# Patient Record
Sex: FEMALE
[De-identification: names, ages, dates, MRNs, and addresses within clinical notes are randomized per-mention and may not be internally consistent; named-entity substitution may affect disease eponyms.]

---

## 2021-04-11 ENCOUNTER — FORM ENCOUNTER (OUTPATIENT)
Age: 36
End: 2021-04-11

## 2021-04-12 ENCOUNTER — FORM ENCOUNTER (OUTPATIENT)
Age: 36
End: 2021-04-12

## 2021-04-12 ENCOUNTER — APPOINTMENT (OUTPATIENT)
Dept: OBGYN | Facility: CLINIC | Age: 36
End: 2021-04-12
Payer: COMMERCIAL

## 2021-04-12 PROCEDURE — 99072 ADDL SUPL MATRL&STAF TM PHE: CPT

## 2021-04-12 PROCEDURE — 99385 PREV VISIT NEW AGE 18-39: CPT

## 2021-09-16 ENCOUNTER — FORM ENCOUNTER (OUTPATIENT)
Age: 36
End: 2021-09-16

## 2021-09-17 ENCOUNTER — APPOINTMENT (OUTPATIENT)
Dept: OBGYN | Facility: CLINIC | Age: 36
End: 2021-09-17
Payer: COMMERCIAL

## 2021-09-17 PROCEDURE — 76817 TRANSVAGINAL US OBSTETRIC: CPT

## 2021-09-17 PROCEDURE — 36415 COLL VENOUS BLD VENIPUNCTURE: CPT

## 2021-09-17 PROCEDURE — 99213 OFFICE O/P EST LOW 20 MIN: CPT | Mod: 25

## 2021-09-21 ENCOUNTER — FORM ENCOUNTER (OUTPATIENT)
Age: 36
End: 2021-09-21

## 2021-09-26 ENCOUNTER — FORM ENCOUNTER (OUTPATIENT)
Age: 36
End: 2021-09-26

## 2021-09-27 ENCOUNTER — TRANSCRIPTION ENCOUNTER (OUTPATIENT)
Age: 36
End: 2021-09-27

## 2021-10-11 ENCOUNTER — FORM ENCOUNTER (OUTPATIENT)
Age: 36
End: 2021-10-11

## 2021-10-12 ENCOUNTER — APPOINTMENT (OUTPATIENT)
Dept: OBGYN | Facility: CLINIC | Age: 36
End: 2021-10-12
Payer: COMMERCIAL

## 2021-10-12 PROCEDURE — 0500F INITIAL PRENATAL CARE VISIT: CPT

## 2021-10-12 PROCEDURE — 76817 TRANSVAGINAL US OBSTETRIC: CPT

## 2021-10-12 PROCEDURE — 36415 COLL VENOUS BLD VENIPUNCTURE: CPT

## 2021-10-26 ENCOUNTER — ASOB RESULT (OUTPATIENT)
Age: 36
End: 2021-10-26

## 2021-10-26 ENCOUNTER — APPOINTMENT (OUTPATIENT)
Dept: ANTEPARTUM | Facility: CLINIC | Age: 36
End: 2021-10-26
Payer: COMMERCIAL

## 2021-10-26 PROBLEM — Z00.00 ENCOUNTER FOR PREVENTIVE HEALTH EXAMINATION: Status: ACTIVE | Noted: 2021-10-26

## 2021-10-26 PROCEDURE — 76817 TRANSVAGINAL US OBSTETRIC: CPT

## 2021-10-26 PROCEDURE — 76813 OB US NUCHAL MEAS 1 GEST: CPT

## 2021-10-26 PROCEDURE — 76811 OB US DETAILED SNGL FETUS: CPT

## 2021-11-23 ENCOUNTER — APPOINTMENT (OUTPATIENT)
Dept: OBGYN | Facility: CLINIC | Age: 36
End: 2021-11-23
Payer: COMMERCIAL

## 2021-11-23 VITALS
WEIGHT: 143 LBS | BODY MASS INDEX: 22.44 KG/M2 | SYSTOLIC BLOOD PRESSURE: 94 MMHG | DIASTOLIC BLOOD PRESSURE: 60 MMHG | HEIGHT: 67 IN

## 2021-11-23 DIAGNOSIS — Z86.39 PERSONAL HISTORY OF OTHER ENDOCRINE, NUTRITIONAL AND METABOLIC DISEASE: ICD-10-CM

## 2021-11-23 PROCEDURE — 36415 COLL VENOUS BLD VENIPUNCTURE: CPT

## 2021-11-23 PROCEDURE — 0502F SUBSEQUENT PRENATAL CARE: CPT

## 2021-11-24 ENCOUNTER — APPOINTMENT (OUTPATIENT)
Dept: ANTEPARTUM | Facility: CLINIC | Age: 36
End: 2021-11-24

## 2021-11-28 LAB
1ST TRIMESTER DATA: NORMAL
2ND TRIMESTER DATA: NORMAL
AFP PNL SERPL: NORMAL
AFP SERPL-ACNC: NORMAL
AFP SERPL-ACNC: NORMAL
B-HCG FREE SERPL-MCNC: NORMAL
CLINICAL BIOCHEMIST REVIEW: NORMAL
FREE BETA HCG 1ST TRIMESTER: NORMAL
INHIBIN A SERPL-MCNC: NORMAL
INHIBIN-A 1ST TRIMESTER: NORMAL
NOTES NTD: NORMAL
NT: NORMAL
PAPP-A SERPL-ACNC: NORMAL
PIGF SER-MCNC: NORMAL
U ESTRIOL SERPL-SCNC: NORMAL

## 2021-12-03 ENCOUNTER — TRANSCRIPTION ENCOUNTER (OUTPATIENT)
Age: 36
End: 2021-12-03

## 2022-01-03 ENCOUNTER — APPOINTMENT (OUTPATIENT)
Dept: ANTEPARTUM | Facility: CLINIC | Age: 37
End: 2022-01-03
Payer: COMMERCIAL

## 2022-01-03 ENCOUNTER — ASOB RESULT (OUTPATIENT)
Age: 37
End: 2022-01-03

## 2022-01-03 PROCEDURE — 76811 OB US DETAILED SNGL FETUS: CPT

## 2022-01-03 PROCEDURE — 76817 TRANSVAGINAL US OBSTETRIC: CPT

## 2022-01-07 ENCOUNTER — NON-APPOINTMENT (OUTPATIENT)
Age: 37
End: 2022-01-07

## 2022-01-07 DIAGNOSIS — Z98.890 OTHER SPECIFIED POSTPROCEDURAL STATES: ICD-10-CM

## 2022-01-07 DIAGNOSIS — Z87.891 PERSONAL HISTORY OF NICOTINE DEPENDENCE: ICD-10-CM

## 2022-01-07 DIAGNOSIS — Z87.59 PERSONAL HISTORY OF OTHER COMPLICATIONS OF PREGNANCY, CHILDBIRTH AND THE PUERPERIUM: ICD-10-CM

## 2022-01-07 RX ORDER — ASCORBIC ACID, CHOLECALCIFEROL, .ALPHA.-TOCOPHEROL ACETATE, DL-, PYRIDOXINE, FOLIC ACID, CYANOCOBALAMIN, CALCIUM, FERROUS FUMARATE, MAGNESIUM, DOCONEXENT 85; 200; 10; 25; 1; 12; 140; 27; 45; 300 [IU]/1; [IU]/1; [IU]/1; [IU]/1; MG/1; UG/1; MG/1; MG/1; MG/1; MG/1
CAPSULE, GELATIN COATED ORAL
Refills: 0 | Status: ACTIVE | COMMUNITY

## 2022-01-07 RX ORDER — CHROMIUM 200 MCG
1000 TABLET ORAL
Refills: 0 | Status: ACTIVE | COMMUNITY

## 2022-01-10 ENCOUNTER — APPOINTMENT (OUTPATIENT)
Dept: OBGYN | Facility: CLINIC | Age: 37
End: 2022-01-10
Payer: COMMERCIAL

## 2022-01-10 VITALS
WEIGHT: 152 LBS | BODY MASS INDEX: 23.86 KG/M2 | DIASTOLIC BLOOD PRESSURE: 62 MMHG | SYSTOLIC BLOOD PRESSURE: 94 MMHG | HEIGHT: 67 IN

## 2022-01-10 PROCEDURE — 36415 COLL VENOUS BLD VENIPUNCTURE: CPT

## 2022-01-10 PROCEDURE — 0502F SUBSEQUENT PRENATAL CARE: CPT

## 2022-01-11 ENCOUNTER — NON-APPOINTMENT (OUTPATIENT)
Age: 37
End: 2022-01-11

## 2022-01-11 ENCOUNTER — TRANSCRIPTION ENCOUNTER (OUTPATIENT)
Age: 37
End: 2022-01-11

## 2022-01-11 LAB — TSH SERPL-ACNC: 3.59 UIU/ML

## 2022-01-12 ENCOUNTER — APPOINTMENT (OUTPATIENT)
Dept: ANTEPARTUM | Facility: CLINIC | Age: 37
End: 2022-01-12
Payer: COMMERCIAL

## 2022-01-12 ENCOUNTER — ASOB RESULT (OUTPATIENT)
Age: 37
End: 2022-01-12

## 2022-01-12 PROCEDURE — 76816 OB US FOLLOW-UP PER FETUS: CPT

## 2022-02-16 ENCOUNTER — LABORATORY RESULT (OUTPATIENT)
Age: 37
End: 2022-02-16

## 2022-02-16 ENCOUNTER — APPOINTMENT (OUTPATIENT)
Dept: OBGYN | Facility: CLINIC | Age: 37
End: 2022-02-16
Payer: COMMERCIAL

## 2022-02-16 ENCOUNTER — NON-APPOINTMENT (OUTPATIENT)
Age: 37
End: 2022-02-16

## 2022-02-16 VITALS
BODY MASS INDEX: 24.94 KG/M2 | HEIGHT: 67 IN | DIASTOLIC BLOOD PRESSURE: 64 MMHG | SYSTOLIC BLOOD PRESSURE: 110 MMHG | WEIGHT: 158.93 LBS

## 2022-02-16 PROCEDURE — 0502F SUBSEQUENT PRENATAL CARE: CPT

## 2022-02-17 LAB
25(OH)D3 SERPL-MCNC: 30.9 NG/ML
BASOPHILS # BLD AUTO: 0.02 K/UL
BASOPHILS NFR BLD AUTO: 0.2 %
EOSINOPHIL # BLD AUTO: 0.16 K/UL
EOSINOPHIL NFR BLD AUTO: 1.7 %
GLUCOSE 1H P 50 G GLC PO SERPL-MCNC: 57 MG/DL
HCT VFR BLD CALC: 31.7 %
HGB BLD-MCNC: 10.1 G/DL
IMM GRANULOCYTES NFR BLD AUTO: 0.5 %
LYMPHOCYTES # BLD AUTO: 1.85 K/UL
LYMPHOCYTES NFR BLD AUTO: 19.6 %
MAN DIFF?: NORMAL
MCHC RBC-ENTMCNC: 31.3 PG
MCHC RBC-ENTMCNC: 31.9 GM/DL
MCV RBC AUTO: 98.1 FL
MONOCYTES # BLD AUTO: 0.85 K/UL
MONOCYTES NFR BLD AUTO: 9 %
NEUTROPHILS # BLD AUTO: 6.51 K/UL
NEUTROPHILS NFR BLD AUTO: 69 %
PLATELET # BLD AUTO: 240 K/UL
RBC # BLD: 3.23 M/UL
RBC # FLD: 12.9 %
WBC # FLD AUTO: 9.44 K/UL

## 2022-02-18 ENCOUNTER — TRANSCRIPTION ENCOUNTER (OUTPATIENT)
Age: 37
End: 2022-02-18

## 2022-02-18 LAB
HIV1+2 AB SPEC QL IA.RAPID: NONREACTIVE
T PALLIDUM AB SER QL IA: NEGATIVE

## 2022-02-22 ENCOUNTER — TRANSCRIPTION ENCOUNTER (OUTPATIENT)
Age: 37
End: 2022-02-22

## 2022-03-18 ENCOUNTER — APPOINTMENT (OUTPATIENT)
Dept: OBGYN | Facility: CLINIC | Age: 37
End: 2022-03-18
Payer: COMMERCIAL

## 2022-03-18 ENCOUNTER — NON-APPOINTMENT (OUTPATIENT)
Age: 37
End: 2022-03-18

## 2022-03-18 ENCOUNTER — ASOB RESULT (OUTPATIENT)
Age: 37
End: 2022-03-18

## 2022-03-18 PROCEDURE — 76816 OB US FOLLOW-UP PER FETUS: CPT

## 2022-03-18 PROCEDURE — 0502F SUBSEQUENT PRENATAL CARE: CPT

## 2022-03-18 PROCEDURE — 76819 FETAL BIOPHYS PROFIL W/O NST: CPT

## 2022-03-21 LAB
CANDIDA VAG CYTO: NOT DETECTED
G VAGINALIS+PREV SP MTYP VAG QL MICRO: NOT DETECTED
T VAGINALIS VAG QL WET PREP: NOT DETECTED

## 2022-03-29 ENCOUNTER — APPOINTMENT (OUTPATIENT)
Dept: OBGYN | Facility: CLINIC | Age: 37
End: 2022-03-29
Payer: COMMERCIAL

## 2022-03-29 VITALS
HEIGHT: 67 IN | SYSTOLIC BLOOD PRESSURE: 122 MMHG | DIASTOLIC BLOOD PRESSURE: 70 MMHG | WEIGHT: 162.68 LBS | BODY MASS INDEX: 25.53 KG/M2

## 2022-03-29 DIAGNOSIS — Z23 ENCOUNTER FOR IMMUNIZATION: ICD-10-CM

## 2022-03-29 PROCEDURE — 90715 TDAP VACCINE 7 YRS/> IM: CPT

## 2022-03-29 PROCEDURE — 90471 IMMUNIZATION ADMIN: CPT

## 2022-03-29 PROCEDURE — 0502F SUBSEQUENT PRENATAL CARE: CPT

## 2022-04-13 ENCOUNTER — NON-APPOINTMENT (OUTPATIENT)
Age: 37
End: 2022-04-13

## 2022-04-13 ENCOUNTER — APPOINTMENT (OUTPATIENT)
Dept: OBGYN | Facility: CLINIC | Age: 37
End: 2022-04-13
Payer: COMMERCIAL

## 2022-04-13 VITALS
WEIGHT: 160.93 LBS | SYSTOLIC BLOOD PRESSURE: 106 MMHG | DIASTOLIC BLOOD PRESSURE: 62 MMHG | HEIGHT: 67 IN | BODY MASS INDEX: 25.26 KG/M2

## 2022-04-13 PROCEDURE — 0502F SUBSEQUENT PRENATAL CARE: CPT

## 2022-04-15 LAB
GP B STREP DNA SPEC QL NAA+PROBE: NORMAL
GP B STREP DNA SPEC QL NAA+PROBE: NOT DETECTED
SOURCE GBS: NORMAL

## 2022-04-18 ENCOUNTER — NON-APPOINTMENT (OUTPATIENT)
Age: 37
End: 2022-04-18

## 2022-04-26 ENCOUNTER — APPOINTMENT (OUTPATIENT)
Dept: OBGYN | Facility: CLINIC | Age: 37
End: 2022-04-26
Payer: COMMERCIAL

## 2022-04-26 VITALS
WEIGHT: 163.34 LBS | DIASTOLIC BLOOD PRESSURE: 60 MMHG | HEIGHT: 67 IN | SYSTOLIC BLOOD PRESSURE: 102 MMHG | BODY MASS INDEX: 25.64 KG/M2

## 2022-04-26 PROCEDURE — 0502F SUBSEQUENT PRENATAL CARE: CPT

## 2022-05-06 ENCOUNTER — APPOINTMENT (OUTPATIENT)
Dept: OBGYN | Facility: CLINIC | Age: 37
End: 2022-05-06
Payer: COMMERCIAL

## 2022-05-06 VITALS
DIASTOLIC BLOOD PRESSURE: 60 MMHG | HEIGHT: 67 IN | BODY MASS INDEX: 26.21 KG/M2 | SYSTOLIC BLOOD PRESSURE: 102 MMHG | WEIGHT: 167 LBS

## 2022-05-06 PROCEDURE — 0502F SUBSEQUENT PRENATAL CARE: CPT

## 2022-05-10 ENCOUNTER — INPATIENT (INPATIENT)
Facility: HOSPITAL | Age: 37
LOS: 2 days | Discharge: HOME | End: 2022-05-13
Attending: STUDENT IN AN ORGANIZED HEALTH CARE EDUCATION/TRAINING PROGRAM | Admitting: STUDENT IN AN ORGANIZED HEALTH CARE EDUCATION/TRAINING PROGRAM
Payer: COMMERCIAL

## 2022-05-10 ENCOUNTER — ASOB RESULT (OUTPATIENT)
Age: 37
End: 2022-05-10

## 2022-05-10 ENCOUNTER — NON-APPOINTMENT (OUTPATIENT)
Age: 37
End: 2022-05-10

## 2022-05-10 ENCOUNTER — APPOINTMENT (OUTPATIENT)
Dept: OBGYN | Facility: CLINIC | Age: 37
End: 2022-05-10
Payer: COMMERCIAL

## 2022-05-10 VITALS
TEMPERATURE: 98 F | DIASTOLIC BLOOD PRESSURE: 64 MMHG | HEART RATE: 69 BPM | SYSTOLIC BLOOD PRESSURE: 109 MMHG | RESPIRATION RATE: 18 BRPM

## 2022-05-10 DIAGNOSIS — Z3A.00 WEEKS OF GESTATION OF PREGNANCY NOT SPECIFIED: ICD-10-CM

## 2022-05-10 DIAGNOSIS — Z34.80 ENCOUNTER FOR SUPERVISION OF OTHER NORMAL PREGNANCY, UNSPECIFIED TRIMESTER: ICD-10-CM

## 2022-05-10 DIAGNOSIS — O26.899 OTHER SPECIFIED PREGNANCY RELATED CONDITIONS, UNSPECIFIED TRIMESTER: ICD-10-CM

## 2022-05-10 LAB
BASOPHILS # BLD AUTO: 0.02 K/UL — SIGNIFICANT CHANGE UP (ref 0–0.2)
BASOPHILS NFR BLD AUTO: 0.2 % — SIGNIFICANT CHANGE UP (ref 0–2)
BLD GP AB SCN SERPL QL: POSITIVE — SIGNIFICANT CHANGE UP
COVID-19 SPIKE DOMAIN AB INTERP: POSITIVE
COVID-19 SPIKE DOMAIN ANTIBODY RESULT: >250 U/ML — HIGH
EOSINOPHIL # BLD AUTO: 0.08 K/UL — SIGNIFICANT CHANGE UP (ref 0–0.5)
EOSINOPHIL NFR BLD AUTO: 0.8 % — SIGNIFICANT CHANGE UP (ref 0–6)
HCT VFR BLD CALC: 40.7 % — SIGNIFICANT CHANGE UP (ref 34.5–45)
HGB BLD-MCNC: 13.3 G/DL — SIGNIFICANT CHANGE UP (ref 11.5–15.5)
IMM GRANULOCYTES NFR BLD AUTO: 0.8 % — SIGNIFICANT CHANGE UP (ref 0–1.5)
LYMPHOCYTES # BLD AUTO: 1.27 K/UL — SIGNIFICANT CHANGE UP (ref 1–3.3)
LYMPHOCYTES # BLD AUTO: 13.4 % — SIGNIFICANT CHANGE UP (ref 13–44)
MCHC RBC-ENTMCNC: 31.6 PG — SIGNIFICANT CHANGE UP (ref 27–34)
MCHC RBC-ENTMCNC: 32.7 GM/DL — SIGNIFICANT CHANGE UP (ref 32–36)
MCV RBC AUTO: 96.7 FL — SIGNIFICANT CHANGE UP (ref 80–100)
MONOCYTES # BLD AUTO: 0.67 K/UL — SIGNIFICANT CHANGE UP (ref 0–0.9)
MONOCYTES NFR BLD AUTO: 7.1 % — SIGNIFICANT CHANGE UP (ref 2–14)
NEUTROPHILS # BLD AUTO: 7.36 K/UL — SIGNIFICANT CHANGE UP (ref 1.8–7.4)
NEUTROPHILS NFR BLD AUTO: 77.7 % — HIGH (ref 43–77)
NRBC # BLD: 0 /100 WBCS — SIGNIFICANT CHANGE UP (ref 0–0)
PLATELET # BLD AUTO: 178 K/UL — SIGNIFICANT CHANGE UP (ref 150–400)
RBC # BLD: 4.21 M/UL — SIGNIFICANT CHANGE UP (ref 3.8–5.2)
RBC # FLD: 13.4 % — SIGNIFICANT CHANGE UP (ref 10.3–14.5)
RH IG SCN BLD-IMP: NEGATIVE — SIGNIFICANT CHANGE UP
RH IG SCN BLD-IMP: NEGATIVE — SIGNIFICANT CHANGE UP
SARS-COV-2 IGG+IGM SERPL QL IA: >250 U/ML — HIGH
SARS-COV-2 IGG+IGM SERPL QL IA: POSITIVE
SARS-COV-2 RNA SPEC QL NAA+PROBE: SIGNIFICANT CHANGE UP
WBC # BLD: 9.48 K/UL — SIGNIFICANT CHANGE UP (ref 3.8–10.5)
WBC # FLD AUTO: 9.48 K/UL — SIGNIFICANT CHANGE UP (ref 3.8–10.5)

## 2022-05-10 PROCEDURE — 76819 FETAL BIOPHYS PROFIL W/O NST: CPT

## 2022-05-10 PROCEDURE — 86077 PHYS BLOOD BANK SERV XMATCH: CPT

## 2022-05-10 PROCEDURE — 76816 OB US FOLLOW-UP PER FETUS: CPT

## 2022-05-10 PROCEDURE — 0502F SUBSEQUENT PRENATAL CARE: CPT

## 2022-05-10 RX ORDER — SODIUM CHLORIDE 9 MG/ML
1000 INJECTION, SOLUTION INTRAVENOUS
Refills: 0 | Status: DISCONTINUED | OUTPATIENT
Start: 2022-05-10 | End: 2022-05-11

## 2022-05-10 RX ORDER — OXYTOCIN 10 UNIT/ML
333.33 VIAL (ML) INJECTION
Qty: 20 | Refills: 0 | Status: DISCONTINUED | OUTPATIENT
Start: 2022-05-10 | End: 2022-05-10

## 2022-05-10 RX ORDER — OXYTOCIN 10 UNIT/ML
333.33 VIAL (ML) INJECTION
Qty: 20 | Refills: 0 | Status: DISCONTINUED | OUTPATIENT
Start: 2022-05-10 | End: 2022-05-13

## 2022-05-10 RX ORDER — CITRIC ACID/SODIUM CITRATE 300-500 MG
15 SOLUTION, ORAL ORAL EVERY 6 HOURS
Refills: 0 | Status: DISCONTINUED | OUTPATIENT
Start: 2022-05-10 | End: 2022-05-10

## 2022-05-10 RX ORDER — CITRIC ACID/SODIUM CITRATE 300-500 MG
15 SOLUTION, ORAL ORAL EVERY 6 HOURS
Refills: 0 | Status: DISCONTINUED | OUTPATIENT
Start: 2022-05-10 | End: 2022-05-11

## 2022-05-10 RX ORDER — OXYTOCIN 10 UNIT/ML
4 VIAL (ML) INJECTION
Qty: 30 | Refills: 0 | Status: DISCONTINUED | OUTPATIENT
Start: 2022-05-10 | End: 2022-05-11

## 2022-05-10 RX ADMIN — Medication 4 MILLIUNIT(S)/MIN: at 14:10

## 2022-05-10 NOTE — OB PROVIDER LABOR PROGRESS NOTE - ASSESSMENT
35yo P1 @ 40wks, iol for oligo  on pitocin  cont epidural    peanut ball  maternal and fetal status overall reassuring  anticipate anuradha parham MD
37yo P1 @ 40wks, iol for oligo  on pitocin  arom, scant clear fluid  epidural prn  peanut ball  maternal and fetal status overall reassuring  anticipate   h prasad PETTY

## 2022-05-10 NOTE — OB PROVIDER H&P - HISTORY OF PRESENT ILLNESS
35yo  [facility] 5/10/22 @ 40weeks sent from the office for IOL for oligohydramnios. Pt denies ctx/lof/vb  +FM  GBS negative   EFW 3700  PNC c/b anemia  PNL:    OB: 2018 FT  7#        2020 6w sab (no D&C)  GYN:Denies fibroids/ovarian cysts/STI's/abnl pap  PMH: h/o hypothyroidism, now euthyroid  PSH: none  MEDS: PNV  ALL:NKDA  Accepts blood. Denies h/o anxiety/depression.  35yo  [facility] 5/10/22 @ 40weeks sent from the office for IOL for oligohydramnios. JM 3.46. Pt denies ctx/lof/vb  +FM  GBS negative   EFW 3700  PNC c/b iron deficiency anemia  PNL:    OB: 2018 FT  7#        2020 6w sab (no D&C)  GYN:Denies fibroids/ovarian cysts/STI's/abnl pap  PMH: h/o hypothyroidism, now euthyroid  PSH: none  MEDS: PNV  ALL:NKDA  Accepts blood. Denies h/o anxiety/depression.

## 2022-05-10 NOTE — OB PROVIDER H&P - NSHPPHYSICALEXAM_GEN_ALL_CORE
T(C): --  HR: 69 (05-10-22 @ 12:52) (69 - 69)  BP: 109/64 (05-10-22 @ 13:03) (109/64 - 109/64)    GEN:NAD  CV:RRR  Pulm: CTA bl  Abd soft NT gravid  FHT:   130  , mod flor, + accels, - decels   TOCO:  no ctx  VE: 1.5/50/-3 per Dr Martinez MONGE vtx

## 2022-05-10 NOTE — OB PROVIDER H&P - ASSESSMENT
AP 35yo  [facility] 5/10/22 @40 weeks, IOL for oligohydramnios, GBS negative  1.Admit. CLD. Routine labs  2.Cat 1 FHT, cw EF/TOCO  3.IOL for Pitocin/AROM  4. Anticipate   5. Anesthesia consult  DW Dr Martinez Shi PAC

## 2022-05-10 NOTE — OB PROVIDER H&P - ATTENDING COMMENTS
agree w above by EDUARDA Shi  briefly, 37yo  @40 weeks, IOL for oligohydramnios, GBS negative  IOL for Pitocin/AROM  Anticipate   Epi prn  plan of care reviewed, questions answered  INDIGO Henriquez MD

## 2022-05-10 NOTE — OB RN PATIENT PROFILE - FUNCTIONAL ASSESSMENT - BASIC MOBILITY 3.
,[email or web address],DirectAddress_Unknown,DirectAddress_Unknown
4 = No assist / stand by assistance

## 2022-05-11 LAB — T PALLIDUM AB TITR SER: NEGATIVE — SIGNIFICANT CHANGE UP

## 2022-05-11 PROCEDURE — 59400 OBSTETRICAL CARE: CPT

## 2022-05-11 RX ORDER — IBUPROFEN 200 MG
600 TABLET ORAL EVERY 6 HOURS
Refills: 0 | Status: COMPLETED | OUTPATIENT
Start: 2022-05-11 | End: 2023-04-09

## 2022-05-11 RX ORDER — DIBUCAINE 1 %
1 OINTMENT (GRAM) RECTAL EVERY 6 HOURS
Refills: 0 | Status: DISCONTINUED | OUTPATIENT
Start: 2022-05-11 | End: 2022-05-13

## 2022-05-11 RX ORDER — OXYTOCIN 10 UNIT/ML
333.33 VIAL (ML) INJECTION
Qty: 20 | Refills: 0 | Status: DISCONTINUED | OUTPATIENT
Start: 2022-05-11 | End: 2022-05-13

## 2022-05-11 RX ORDER — ACETAMINOPHEN 500 MG
975 TABLET ORAL
Refills: 0 | Status: DISCONTINUED | OUTPATIENT
Start: 2022-05-11 | End: 2022-05-13

## 2022-05-11 RX ORDER — TETANUS TOXOID, REDUCED DIPHTHERIA TOXOID AND ACELLULAR PERTUSSIS VACCINE, ADSORBED 5; 2.5; 8; 8; 2.5 [IU]/.5ML; [IU]/.5ML; UG/.5ML; UG/.5ML; UG/.5ML
0.5 SUSPENSION INTRAMUSCULAR ONCE
Refills: 0 | Status: DISCONTINUED | OUTPATIENT
Start: 2022-05-11 | End: 2022-05-13

## 2022-05-11 RX ORDER — BENZOCAINE 10 %
1 GEL (GRAM) MUCOUS MEMBRANE EVERY 6 HOURS
Refills: 0 | Status: DISCONTINUED | OUTPATIENT
Start: 2022-05-11 | End: 2022-05-13

## 2022-05-11 RX ORDER — AER TRAVELER 0.5 G/1
1 SOLUTION RECTAL; TOPICAL EVERY 4 HOURS
Refills: 0 | Status: DISCONTINUED | OUTPATIENT
Start: 2022-05-11 | End: 2022-05-13

## 2022-05-11 RX ORDER — OXYCODONE HYDROCHLORIDE 5 MG/1
5 TABLET ORAL ONCE
Refills: 0 | Status: DISCONTINUED | OUTPATIENT
Start: 2022-05-11 | End: 2022-05-13

## 2022-05-11 RX ORDER — IBUPROFEN 200 MG
600 TABLET ORAL EVERY 6 HOURS
Refills: 0 | Status: DISCONTINUED | OUTPATIENT
Start: 2022-05-11 | End: 2022-05-13

## 2022-05-11 RX ORDER — DIPHENHYDRAMINE HCL 50 MG
25 CAPSULE ORAL EVERY 6 HOURS
Refills: 0 | Status: DISCONTINUED | OUTPATIENT
Start: 2022-05-11 | End: 2022-05-13

## 2022-05-11 RX ORDER — SODIUM CHLORIDE 9 MG/ML
1000 INJECTION, SOLUTION INTRAVENOUS
Refills: 0 | Status: DISCONTINUED | OUTPATIENT
Start: 2022-05-11 | End: 2022-05-11

## 2022-05-11 RX ORDER — MAGNESIUM HYDROXIDE 400 MG/1
30 TABLET, CHEWABLE ORAL
Refills: 0 | Status: DISCONTINUED | OUTPATIENT
Start: 2022-05-11 | End: 2022-05-13

## 2022-05-11 RX ORDER — HYDROCORTISONE 1 %
1 OINTMENT (GRAM) TOPICAL EVERY 6 HOURS
Refills: 0 | Status: DISCONTINUED | OUTPATIENT
Start: 2022-05-11 | End: 2022-05-13

## 2022-05-11 RX ORDER — KETOROLAC TROMETHAMINE 30 MG/ML
30 SYRINGE (ML) INJECTION ONCE
Refills: 0 | Status: DISCONTINUED | OUTPATIENT
Start: 2022-05-11 | End: 2022-05-11

## 2022-05-11 RX ORDER — SODIUM CHLORIDE 9 MG/ML
3 INJECTION INTRAMUSCULAR; INTRAVENOUS; SUBCUTANEOUS EVERY 8 HOURS
Refills: 0 | Status: DISCONTINUED | OUTPATIENT
Start: 2022-05-11 | End: 2022-05-13

## 2022-05-11 RX ORDER — ONDANSETRON 8 MG/1
4 TABLET, FILM COATED ORAL ONCE
Refills: 0 | Status: DISCONTINUED | OUTPATIENT
Start: 2022-05-11 | End: 2022-05-11

## 2022-05-11 RX ORDER — SIMETHICONE 80 MG/1
80 TABLET, CHEWABLE ORAL EVERY 4 HOURS
Refills: 0 | Status: DISCONTINUED | OUTPATIENT
Start: 2022-05-11 | End: 2022-05-13

## 2022-05-11 RX ORDER — PRAMOXINE HYDROCHLORIDE 150 MG/15G
1 AEROSOL, FOAM RECTAL EVERY 4 HOURS
Refills: 0 | Status: DISCONTINUED | OUTPATIENT
Start: 2022-05-11 | End: 2022-05-13

## 2022-05-11 RX ORDER — OXYCODONE HYDROCHLORIDE 5 MG/1
5 TABLET ORAL
Refills: 0 | Status: DISCONTINUED | OUTPATIENT
Start: 2022-05-11 | End: 2022-05-13

## 2022-05-11 RX ORDER — LANOLIN
1 OINTMENT (GRAM) TOPICAL EVERY 6 HOURS
Refills: 0 | Status: DISCONTINUED | OUTPATIENT
Start: 2022-05-11 | End: 2022-05-13

## 2022-05-11 RX ADMIN — Medication 0.2 MILLIGRAM(S): at 04:41

## 2022-05-11 RX ADMIN — Medication 30 MILLIGRAM(S): at 08:37

## 2022-05-11 RX ADMIN — Medication 600 MILLIGRAM(S): at 18:00

## 2022-05-11 RX ADMIN — Medication 600 MILLIGRAM(S): at 17:18

## 2022-05-11 RX ADMIN — Medication 30 MILLIGRAM(S): at 07:58

## 2022-05-11 RX ADMIN — Medication 1 TABLET(S): at 14:36

## 2022-05-11 RX ADMIN — Medication 1000 MILLIUNIT(S)/MIN: at 05:45

## 2022-05-11 NOTE — OB PROVIDER DELIVERY SUMMARY - NSPROVIDERDELIVERYNOTE_OBGYN_ALL_OB_FT
patient pushed to deliver liveborn male infant. head, shoulders, body delivered with ease. infant placed on mother's abdomen. delayed cord clamping x 60sec. cord then clamped and cut. placenta delivered spontaneously, intact. periurethral and b/l 1st degree lacerations repaired with 3-0 vircyl rapide in usual fashion. hemostatic at end of repair. counts correct. mother and baby stable in delivery room.

## 2022-05-11 NOTE — OB RN DELIVERY SUMMARY - NS_SEPSISRSKCALC_OBGYN_ALL_OB_FT
EOS calculated successfully. EOS Risk Factor: 0.5/1000 live births ([facility] national incidence); GA=40w1d; Temp=98.42; ROM=13.75; GBS='Negative'; Antibiotics='No antibiotics or any antibiotics < 2 hrs prior to birth'

## 2022-05-11 NOTE — OB RN DELIVERY SUMMARY - NSSELHIDDEN_OBGYN_ALL_OB_FT
[NS_DeliveryAttending1_OBGYN_ALL_OB_FT:[id number]=],[NS_DeliveryAssist1_OBGYN_ALL_OB_FT:[id number]=],[NS_DeliveryRN_OBGYN_ALL_OB_FT:[id number]=]

## 2022-05-12 LAB — KLEIHAUER-BETKE CALCULATION: 0 % — SIGNIFICANT CHANGE UP (ref 0–0.3)

## 2022-05-12 RX ADMIN — Medication 975 MILLIGRAM(S): at 08:55

## 2022-05-12 RX ADMIN — Medication 600 MILLIGRAM(S): at 18:30

## 2022-05-12 RX ADMIN — Medication 975 MILLIGRAM(S): at 08:28

## 2022-05-12 RX ADMIN — Medication 600 MILLIGRAM(S): at 11:49

## 2022-05-12 RX ADMIN — Medication 1 TABLET(S): at 11:50

## 2022-05-12 RX ADMIN — Medication 600 MILLIGRAM(S): at 12:20

## 2022-05-12 RX ADMIN — Medication 600 MILLIGRAM(S): at 17:56

## 2022-05-12 NOTE — PROGRESS NOTE ADULT - ATTENDING COMMENTS
pt seen and examined. doing well overall. receiving LC help and just now starting to latch. still having some pain issues. will continue to evaluate today and dc home tomorrow.    diony valdivia

## 2022-05-12 NOTE — CHART NOTE - NSCHARTNOTEFT_GEN_A_CORE
late entry--- events below related to delivery 5/11 AM.    please note  that when pt was fully dilated, she was at 0 station at approx 1:20am. variables noted/ pitocin discontinued. pt allowed to labor down. after an hour (~2:20am) restarted pitocin as contractions had spaced out and started pushing. no descent appreciated, and OT position appreciated on exam. attempted manual rotation of fetal head to OA but persistent OT position noted. stopped pushing after an hour (~3:30am). used multiple pt positions to help rotate fetus to OA. after positioning, sono brought into room. OA position then confirmed on sono. PTA had with safety officers given 2hr second stage.   on exam, OA position confirmed, also slightly asynclitic.  resumed pushing, and descent of fetal head now appreciated.   patient pushed to achieve vaginal delivery just before 4:30am.

## 2022-05-12 NOTE — PROGRESS NOTE ADULT - ASSESSMENT
35y/o  PPD#1 from [facility]. Patient is currently in stable condition.    #Routine Postpartum Care  - Continue with PO analgesia  - Increase ambulation  - Continue regular diet    Sophia Quan  PGY-1

## 2022-05-12 NOTE — PROGRESS NOTE ADULT - SUBJECTIVE AND OBJECTIVE BOX
Patient seen and examined at bedside, no acute overnight events. No acute complaints, pain well controlled. Patient is ambulating, voiding spontaneously, passing gas, and tolerating regular diet. Denies CP, SOB, N/V, HA, blurred vision, epigastric pain.    Vital Signs Last 24 Hours  T(C): 36.7 (05-12-22 @ 05:39), Max: 36.7 (05-11-22 @ 08:37)  HR: 68 (05-12-22 @ 05:39) (63 - 78)  BP: 98/60 (05-12-22 @ 05:39) (98/60 - 130/71)  RR: 18 (05-12-22 @ 05:39) (16 - 18)  SpO2: 98% (05-12-22 @ 05:39) (96% - 98%)    Physical Exam:  General: NAD  Abdomen: Soft, non-tender, non-distended, fundus firm  Pelvic: Lochia wnl, external exam of perineum clean and dry without swelling    Labs:    Blood Type: A Negative  Antibody Screen: Positive  RPR: Negative               13.3   9.48  )-----------( 178      ( 05-10 @ 14:56 )             40.7         MEDICATIONS  (STANDING):  acetaminophen     Tablet .. 975 milliGRAM(s) Oral <User Schedule>  diphtheria/tetanus/pertussis (acellular) Vaccine (ADAcel) 0.5 milliLiter(s) IntraMuscular once  ibuprofen  Tablet. 600 milliGRAM(s) Oral every 6 hours  oxytocin Infusion 333.333 milliUNIT(s)/Min (1000 mL/Hr) IV Continuous <Continuous>  oxytocin Infusion 333.333 milliUNIT(s)/Min (1000 mL/Hr) IV Continuous <Continuous>  prenatal multivitamin 1 Tablet(s) Oral daily  sodium chloride 0.9% lock flush 3 milliLiter(s) IV Push every 8 hours    MEDICATIONS  (PRN):  benzocaine 20%/menthol 0.5% Spray 1 Spray(s) Topical every 6 hours PRN for Perineal discomfort  dibucaine 1% Ointment 1 Application(s) Topical every 6 hours PRN Perineal discomfort  diphenhydrAMINE 25 milliGRAM(s) Oral every 6 hours PRN Pruritus  hydrocortisone 1% Cream 1 Application(s) Topical every 6 hours PRN Moderate Pain (4-6)  lanolin Ointment 1 Application(s) Topical every 6 hours PRN nipple soreness  magnesium hydroxide Suspension 30 milliLiter(s) Oral two times a day PRN Constipation  oxyCODONE    IR 5 milliGRAM(s) Oral every 3 hours PRN Moderate to Severe Pain (4-10)  oxyCODONE    IR 5 milliGRAM(s) Oral once PRN Moderate to Severe Pain (4-10)  pramoxine 1%/zinc 5% Cream 1 Application(s) Topical every 4 hours PRN Moderate Pain (4-6)  simethicone 80 milliGRAM(s) Chew every 4 hours PRN Gas  witch hazel Pads 1 Application(s) Topical every 4 hours PRN Perineal discomfort

## 2022-05-13 ENCOUNTER — TRANSCRIPTION ENCOUNTER (OUTPATIENT)
Age: 37
End: 2022-05-13

## 2022-05-13 VITALS
RESPIRATION RATE: 18 BRPM | DIASTOLIC BLOOD PRESSURE: 67 MMHG | SYSTOLIC BLOOD PRESSURE: 101 MMHG | OXYGEN SATURATION: 97 % | HEART RATE: 81 BPM | TEMPERATURE: 98 F

## 2022-05-13 PROCEDURE — 85025 COMPLETE CBC W/AUTO DIFF WBC: CPT

## 2022-05-13 PROCEDURE — 86850 RBC ANTIBODY SCREEN: CPT

## 2022-05-13 PROCEDURE — 87635 SARS-COV-2 COVID-19 AMP PRB: CPT

## 2022-05-13 PROCEDURE — 85460 HEMOGLOBIN FETAL: CPT

## 2022-05-13 PROCEDURE — 86900 BLOOD TYPING SEROLOGIC ABO: CPT

## 2022-05-13 PROCEDURE — 86870 RBC ANTIBODY IDENTIFICATION: CPT

## 2022-05-13 PROCEDURE — 86769 SARS-COV-2 COVID-19 ANTIBODY: CPT

## 2022-05-13 PROCEDURE — G0463: CPT

## 2022-05-13 PROCEDURE — 86901 BLOOD TYPING SEROLOGIC RH(D): CPT

## 2022-05-13 PROCEDURE — 86780 TREPONEMA PALLIDUM: CPT

## 2022-05-13 PROCEDURE — 36415 COLL VENOUS BLD VENIPUNCTURE: CPT

## 2022-05-13 PROCEDURE — 59025 FETAL NON-STRESS TEST: CPT

## 2022-05-13 PROCEDURE — 59050 FETAL MONITOR W/REPORT: CPT

## 2022-05-13 RX ORDER — IBUPROFEN 200 MG
1 TABLET ORAL
Qty: 0 | Refills: 0 | DISCHARGE
Start: 2022-05-13

## 2022-05-13 RX ADMIN — Medication 1 TABLET(S): at 12:11

## 2022-05-13 RX ADMIN — Medication 600 MILLIGRAM(S): at 00:16

## 2022-05-13 RX ADMIN — Medication 600 MILLIGRAM(S): at 12:25

## 2022-05-13 RX ADMIN — Medication 600 MILLIGRAM(S): at 12:11

## 2022-05-13 RX ADMIN — Medication 600 MILLIGRAM(S): at 01:00

## 2022-05-13 NOTE — DISCHARGE NOTE OB - HOSPITAL COURSE
PT admitted at 40 weeks for induction secondary to oligohydramnios. Pt had uncomplicated labor and delivery. Pt had uncomplicated postpartum course. On postpartum day 2 patient met criteria for discharge to follow up in the office in 6 weeks.

## 2022-05-13 NOTE — DISCHARGE NOTE OB - NSDCQMSTAIRS_GEN_ALL_CORE
PHYSICAL EXAMINATION FORM   Name: Kristie Espinosa      EXAMINATION     Vitals: /60   Pulse 70   Temp 98.1 °F (36.7 °C) (Temporal)   Resp 18   Ht 5' 11.3\" (1.811 m)   Wt 60.4 kg (133 lb 2 oz)   BMI 18.41 kg/m²   BSA 1.78 m²   female  Vision: R 20/               L 20/                      Corrected   Y         N     MEDICAL NORMAL ABNORMAL FINDINGS   Appearance  · Marfan stigmata (kyphoscoliosis, high-arched palate, pectus excavatum, arachnodactyly, arm span > height, hyperlaxity, myopia, MVP, aortic insufficiency) Yes    Eyes/ears/nose/throat  · Pupils equal  · Hearing Yes    Lymph nodes Yes    Heart*  · Murmurs (auscultation standing, supine, +/- Valsalva)  · Location of point of maximal impulse (PMI) Yes    Pulses  · Simultaneous femoral and radial pulses Yes    Lungs Yes    Abdomen Yes    Genitourinary (males only)** NA    Skin  · HSV, lesions suggestive of MRSA, tinea corporis Yes    Neurologic# Yes    MUSCULOSKELETAL     Neck Yes    Back Yes    Shoulder/arm Yes    Elbow/forearm Yes    Wrist/hand/fingers Yes    Hip/thigh Yes    Knee Yes    Leg/Ankle Yes    Foot/toes Yes    Functional  · Duck-walk, single leg hop Yes    * Consider ECG, echocardiogram, and referral to cardiology for abnormal cardiac history or exam  ** Consider  exam in private setting.  Having third party present is recommended.  # Consider cognitive evaluation or baseline neuropsychiatric testing if a history of significant concussion.    On the basic on the examination on this day, I approve this child's participation in interscholastic sports for 395 days from this date.  Yes  Examination Date: 8/21/2020    Additional Comments:                    Signature*: _______________________________________           Print Name:  Johanna Harrell MD                         * effective January 2003, the [facility] Board of Directors approved a recommendation, consistent with the [place] [employer] Code, that allow Physician's Assistants or Advanced  Nurse Probationers to sign off on physicals.             To be completed by athlete or parent prior to examination  Name: Kristie Espinosa  School Year: ____________________   Address: ___________________________________________ City/State: ________________________________________________   Phone No: __________________________________________ Birthday: 2007    Age: 13 year old   Class: ________ Student ID No: _______   Parent's Name: ______________________________________ Phone No: ________________________________________________   Address: ___________________________________________ City/State: ________________________________________________   HISTORY FORM    Medicines and Allergies: Please list all of the prescription and over- the-counter medicines (herbal and nutritional) that your are currently taking   Do you have allergies?  __ Yes    __ No If yes, please identify specific allergy below.   __ Medicines                                              __Pollens                                              __Food                                              __Stinging Insects   Explain \"Yes\" answers below.  [place] questions you don't know the answer to.  GENERAL QUESTIONS Yes No    1  Has a doctor ever denied or restricted your participation in sports       for any reason?      2  Do you have any ongoing medical conditions? If so, please        identify below: __Asthma  __Anemia  __Diabetes  __Infections      Other: _________________________________________      3  Have you ever spent the night in the hospital?      4  Have you ever had surgery?      HEART HEALTH QUESTIONS ABOUT YOU Yes No    5  Have you ever passed out or nearly passes out DURING or        AFTER exercise?      6  Have you ever had discomfort, pain, tightness or pressure in       your chest during exercise?       7  Does your heart ever race or skip beats (irregular beats)       during exercise?      8  Has a doctor ever told you that you  have any heart problems?       If so check all that apply: __High blood pressure       __Heart murmur  __High cholesterol  __ Heart infection      __Kawasaki disease  __Other_________________________      9   Has a doctor ever ordered a test for your heart? (for example        ECG/EKG, echocardiogram)      10  Do you every get lightheaded or feel more short of breath         than expected during exercise?       11  Have you ever had an unexplained seizure?      12  Do you get more tired or short of breath more quickly than         your friends during exercise?      HEART HEALTH QUESTIONS ABOUT YOUR FAMILY Yes No    13 Has any family member or relative  of heart problems or         had an unexpected or unexplained sudden death before age         50 (including drowning, unexplained car accident, or sudden        infant death syndrome)?      14 Does anyone in your family have hypertrophic        cardiomyopathy, Marfan syndrome, arrhythmogenic right        ventricular cardiomyopathy, long QT syndrome, short QT        syndrome, Brugada syndrome, or catecholaminergic        polymorphic ventricular tachycardia)?      15 Does anyone in your family have a heart problem,        pacemaker, or implanted defibrillator?      16 Has anyone in your family had unexplained fainting,        unexplained seizure, or near drowning?      BONE AND JOINT QUESTIONS Yes No    17 Have you ever had an injury to a bone, muscle, ligament or        tendon that caused you to miss a practice or a game?      18 Have you ever had any broken or fracture bones or         dislocated joints?      19 Have you ever had any injury that required x-rays, MRI, CT        scan, injections, therapy, brace, cast, or crutches?      20 Have you ever had a stress fracture?      21 Have you ever been told that you have or have had an x-ray        for neck instability or atlantoaxial instability? (Down         syndrome or dwarfism)      22 Do you regularly use a  brace, orthotics, or other assistive         device?      23 Do you have a bone, muscle, or joint injury that bothers you?      24 Do any of your joints become painful, swollen, feel warm, or       look red?      25 Do you have any history of juvenile arthritis or connective       tissue disease?       MEDICAL QUESTIONS Yes No   26 Do you cough, wheeze, or have difficulty breathing during       or after exercise?     27 Have you ever used an inhaler or taken asthma medicine?     28. Is there anyone in your family who has asthma?     29 Were you born without or are you missing a kidney, an eye,         a testicle (males), your spleen, or any other organ?     30 Do you have groin pain or painful bulge or hernia in the groin area?     31 Have you had infectious mononucleosis (mono) within the last        month?     32 Do you have any rashes, pressure sores, or other skin problems?     33 Have you had a herpes or MRSA skin infection?     34 Have you ever had a head injury or concussion?     35 Have you ever had a hit or blow to the head that caused         confusion, prolonged headache, or memory problems?     36 Do you have a history of seizure disorder?     37 Do you have headaches with exercise?     38 Have you ever had numbness, tingling, or weakness in your arms        or legs after being hit or falling?     39 Have you ever been unable to move your or legs after being hit or        falling?     40 Have you ever become ill while exercising in the heat?     41 Do you get frequent muscle cramps when exercising?     42 Do you or someone in your family have sickle cell trait or disease?     43 Have you had any problems with your eyes or vision?     44 Have you had any eye injuries?     45 Do you wear glasses or contact lenses?     46 Do you wear protective eyewear, such as goggles or a face shield?     47 Do you worry about your weight?     48 Are you trying to or has anyone recommended that you gain or         lose  weight?     49 Are you on a special diet or do you avoid certain types of foods?     50 Have you ever had an eating disorder?     51 Have you or any family member or relative been diagnosed with        cancer?     52 Do you have any concerns that you would like to discuss with a        doctor?     FEMALES ONLY Yes No   53 Have you ever had a menstrual period?     54 How old were you when you had your first menstrual period?     55 How many periods have you had in the last 12 months?       Explain 'yes' answers here:  ________________________________________________________________    ________________________________________________________________    ________________________________________________________________    ________________________________________________________________    ________________________________________________________________    ________________________________________________________________    ________________________________________________________________    ________________________________________________________________     I hereby state that, to the best of my knowledge, my answers to the above questions are complete and correct.   Signature of athlete: _____________________________________________ Signature of parent/guardian: ________________________________________      No

## 2022-05-13 NOTE — DISCHARGE NOTE OB - CARE PROVIDER_API CALL
Angie Henriquez  Obstetrics and Gynecology  [address] [place] [id number]  Phone: [phone]  Fax: [phone]  Follow Up Time:

## 2022-05-13 NOTE — DISCHARGE NOTE OB - PATIENT PORTAL LINK FT
You can access the FollowMyHealth Patient Portal offered by [facility] by registering at the following website: [email or web address]. By joining [employer]’s FollowMyHealth portal, you will also be able to view your health information using other applications (apps) compatible with our system.

## 2022-05-13 NOTE — DISCHARGE NOTE OB - CARE PLAN
1 Principal Discharge DX:	Spontaneous vaginal delivery  Assessment and plan of treatment:	Follow up in the office in 6 weeks. Call to schedule an appointment.

## 2022-05-13 NOTE — DISCHARGE NOTE OB - MEDICATION SUMMARY - MEDICATIONS TO TAKE
I will START or STAY ON the medications listed below when I get home from the hospital:    ibuprofen 600 mg oral tablet  -- 1 tab(s) by mouth every 6 hours  -- Indication: For pain    Prenatal Multivitamins oral tablet  -- Indication: For Nutritional supplement

## 2022-05-13 NOTE — DISCHARGE NOTE OB - CARE PROVIDERS DIRECT ADDRESSES
[email or web address]

## 2022-05-23 ENCOUNTER — NON-APPOINTMENT (OUTPATIENT)
Age: 37
End: 2022-05-23

## 2022-06-03 ENCOUNTER — NON-APPOINTMENT (OUTPATIENT)
Age: 37
End: 2022-06-03

## 2022-06-21 ENCOUNTER — APPOINTMENT (OUTPATIENT)
Dept: OBGYN | Facility: CLINIC | Age: 37
End: 2022-06-21
Payer: COMMERCIAL

## 2022-06-21 VITALS
SYSTOLIC BLOOD PRESSURE: 105 MMHG | HEIGHT: 67 IN | DIASTOLIC BLOOD PRESSURE: 70 MMHG | WEIGHT: 150 LBS | BODY MASS INDEX: 23.54 KG/M2

## 2022-06-21 DIAGNOSIS — Z67.91 OTHER SPECIFIED PREGNANCY RELATED CONDITIONS, UNSPECIFIED TRIMESTER: ICD-10-CM

## 2022-06-21 DIAGNOSIS — O26.899 OTHER SPECIFIED PREGNANCY RELATED CONDITIONS, UNSPECIFIED TRIMESTER: ICD-10-CM

## 2022-06-21 DIAGNOSIS — Z34.82 ENCOUNTER FOR SUPERVISION OF OTHER NORMAL PREGNANCY, SECOND TRIMESTER: ICD-10-CM

## 2022-06-21 DIAGNOSIS — Z34.90 ENCOUNTER FOR SUPERVISION OF NORMAL PREGNANCY, UNSPECIFIED, UNSPECIFIED TRIMESTER: ICD-10-CM

## 2022-06-21 DIAGNOSIS — Z34.83 ENCOUNTER FOR SUPERVISION OF OTHER NORMAL PREGNANCY, THIRD TRIMESTER: ICD-10-CM

## 2022-06-21 PROCEDURE — 0503F POSTPARTUM CARE VISIT: CPT

## 2022-06-21 NOTE — HISTORY OF PRESENT ILLNESS
[Delivery Date: ___] : on [unfilled] [Male] : Delivery History: baby boy [Wt. ___] : weighing [unfilled] [Breastfeeding] : currently nursing [] : delivered by vaginal delivery [Back to Normal] : is back to normal in size [Normal] : the vagina was normal [Examination Of The Breasts] : breasts are normal [Doing Well] : is doing well [No Sign of Infection] : is showing no signs of infection [Excellent Pain Control] : has excellent pain control [None] : None [S/Sx PP Depression] : no signs/symptoms of postpartum depression [FreeTextEntry8] : PPV [de-identified] : s/p  on 22 by HL with no complications. Baby boy "Pepito" [de-identified] : Pt is breastfeeding. Denies mastitis sxs. Denies PPD sxs.  [de-identified] : Pt reports bleeding with passing bowel movements. Denies constipation, but reports pain with bowel movements. [de-identified] : Diastasis recti noted [de-identified] : s/p  on 22 by HL with no complications [de-identified] : BC options discussed. Opts for condom use only. Offered referral for PT for mild diastasis recti. Cleared to return to exercise. Advised stool softener, colace, fiber supplements, inc fluid intake to keep stool soft as pt has likely fissure. Advised to d/c iron supplements. RTO for annual in 4-6 months.

## 2022-06-21 NOTE — END OF VISIT
[FreeTextEntry3] : I, Alessandra Trinh, acted as a scribe on behalf of Dr. Tiara Bland on 06/21/2022.\par \par All medical entries made by the scribe were at my, Dr. Tiara Bland, direction and personally dictated by me on 06/21/2022. I have reviewed the chart and agree that the record accurately reflects my personal performance of the history, physical exam, assessment and plan. I have also personally directed, reviewed, and agreed with the chart.

## 2023-10-11 PROBLEM — Z78.9 OTHER SPECIFIED HEALTH STATUS: Chronic | Status: ACTIVE | Noted: 2022-05-10

## 2023-11-01 NOTE — OB PROVIDER DELIVERY SUMMARY - NSLACERATRAPAIRDETAILS_OBGYN_ALL_OB_FT
Physical Therapy Evaluation    Visit Type: Initial Evaluation  Visit: 1  Referring Provider: Eliza Yee PA-C  Medical Diagnosis (from order): Diagnosis Information    Diagnosis  M54.2 (ICD-10-CM) - Neck pain on left side       Treatment Diagnosis: cervical - impaired posture, impaired range of motion, impaired muscle length/flexibility, impaired joint play/mobility, impaired mobility, impaired strength, impaired tissue mobility and impaired activity tolerance.  Onset  - Date of onset:  10-    SUBJECTIVE                                                                                                               L cervical pain and L headache x 2 days but a cough x 2+ wks:negative for covid, strep, flu or RSV.  I saw a chiropractor last night(adjustment).  L UT/cervical ps pain with L sided temple through forehead--ache.  Muscle relaxer helps sleep last night.  She was only able to work 1 hr this am to to cervical pain and a headache.      Pain / Symptoms  - Pain rating (out of 10): Current: 4 ; Best: 4; Worst: 8  - Location: L suboccipital more than cerv ps.  - Quality / Description:      - Throbbing,  she is crying due to the constant pain, poor focus.   - Alleviating Factors: over-the-counter medication, prescription medications, heat, ice  - Progression since onset: no change    Function:   Limitations / Exacerbation Factors:   - Patient reports pain and difficulty with function reported below.  Prior Level of Function: declining function, therefore referred to therapy,    Patient Goals: decreased pain, increased motion and increased strength.    Prior treatment  - no therapies Prior R hip/foot PT.   - Discharged from hospital, home health, or skilled nursing facility in last 30 days: no  Home Environment   - Patient lives with: significant other and young children  - Type of home: multiple level home  - Assistance available: intermittent  - Denies 2 or more falls or an unexplained fall with injury  in the last year.  - Feel safe at home / work / school: yes      OBJECTIVE                                                                                                                    Hand Dominance: right-handed    Observation   OBSERVATION:  L shoulder is slightly higher, forward head.  minimal+ forward head and minimal dowagers,  Difficult to breath with a dennis and her cold sxs.       Range of Motion (ROM)   (degrees unless noted; active unless noted; norms in ( ); negative=lacking to 0, positive=beyond 0)  Cervical:    - Flexion (45-50): WFL    - Extension (45-60): WFL    - Rotation (60-80):        • Left: 50° pain        • Right: 52° pain    - Side Bend (45):        • Left: 40°        • Right: 40° pain  Comments: Protraction:  75%   Retraction:  50% with sharp suboccipital pain.  Couching also hurts the neck more.   cervical compression= no change  cervical distraction= L cervical ps pain.                     Outcome/Assessments  Outcome Measures:   Neck Disability Index (NDI): Neck Disability Index Score: 25  NDI Total Possible Score: 50  NDI Score Calculated: 50 %  (scored 0-100, higher score indicates higher disability) see flowsheet for additional documentation        Treatment    Un-attended Electrical Stimulation (19252/)  - Purpose: pain relief, reduce muscle spasm and improve range of motion  - Location: L suboccipital  more than cervical ps  - Position: prone face in a hole  - Electrode Placement: HIGH sweep  - In conjunction with: cold pack  - Duration: 15 minutes 3 minutes of set-up    Results: decreased pain  Reaction: no adverse reaction to treatment      Therapeutic Exercise  See HOME EXERCISE PROGRAM   I spoke with her and her  about sleeping posture and head supported rest.  He stated he has begun and will do extra baby chores so she can rest/heal.     Manual Therapy   Prone gentle soft tissue mobilization to the L more than R suboccipital area.  Supine gentle SOR(suboccipital  release).     Skilled input: verbal instruction/cues    Writer verbally educated and received verbal consent for hand placement, positioning of patient, and techniques to be performed today from patient for clothing adjustments for techniques, therapist position for techniques, hand placement and palpation for techniques and modality application as described above and how they are pertinent to the patient's plan of care.  Home Exercise Program  Hair pulling for 2-3 minutes.   2-3 times per day.     SLEEPING:   On your side attempt using a thin towel or pillow roll beneath tour waist and a pillow between your legs.   The goal is to be in a midline/neautral position.   Thicker pillow for the head when you are on your side and thinner pillow for the head when you are on your pack(you may use a thin towel roll in the pillow case for the hollow of your neck when on your back).            ASSESSMENT                                                                                                          33 year old patient has reported functional limitations listed above impacted by signs and symptoms consistent with treatment diagnosis below.  Treatment Diagnosis:   - Involved: cervical.  - Symptoms/impairments: impaired posture, impaired range of motion, impaired muscle length/flexibility, impaired joint play/mobility, impaired mobility, impaired strength, impaired tissue mobility and impaired activity tolerance.    L cervical pain and L headache x 2 days but a cough x 2+ wks:negative for covid, strep, flu and rsv.  Pt saw a chiropractor last night(adjustment) and will later today.  L UT/cervical ps pain and L sided temple through forehead--ache.  Muscle relaxer helped sleep last night.  Throbbing,  she is crying due to the constant pain, poor focus. Usually only tension headaches.  \"I feel like I need help holding my head up\".... she cries again.  Retraction:  50% with sharp suboccipital pain but full cervical flex/ext.   Couching also hurts the neck more.  Pain driving and trying to sleep.   Cervical rotation R=52deg and 50deg to the L (L cervical ps in either direction).  Grade 4 TTP/sensitivity to the L suboccipital and grade 2-3 about the cervical ps.   Cervical motion does not seem to be limited by joint injury but by muscle spasm (perhaps secondary to coughing/lifting/stress).  Less pain during prone lying support.   Pain/symptoms after session (out of 10): 4    Prognosis: patient will benefit from skilled therapy  Rehabilitative potential is: good.  Predicted patient presentation: Moderate (evolving) - Patient comorbidities and complexities, as defined above, may have varying impact on steady progress for prescribed plan of care.  Education:   - Present and ready to learn: patient  - Results of above outlined education: Verbalizes understanding, Demonstrates understanding and Needs reinforcement    PLAN                                                                                                                         The following skilled interventions to be implemented to achieve goals listed below:  Neuromuscular Re-Education (49427)  Therapeutic Exercise (73882)  Electrical Stimulation Unattended (00479 or )  Heat/Cold (83898)  Ultrasound (22789)  Manual Therapy (73292)  Therapeutic Activity (98192)  Mechanical Traction-[id number]    Frequency / Duration  2 times per week tapering as patient progresses for 6 weeks for an estimated total of 12 visits    Patient involved in and agreed to plan of care and goals.  Patient given attendance policy at time of initial evaluation.    Suggestions for next session as indicated: Progress per plan of care, ASSESS prone IFC HIGH sweep/ice(VS russian stimulation) and SOR.  Finish the eval which could not de done due to her severe pain.  Include cervical stabilization.  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         Goals  Decrease pain/symptoms to 1/10  Improve involved strength to -  Improve involved ROM to 130deg total cervical rotation  The above improvements in impairments to assist in obtaining goals listed below  Long Term Goals: to be met by end of plan of care  1. Neck Disability Index: Patient will complete form to reflect an improved score to less than or equal to 20% to indicate patient reported improvement in function/disability/impairment (minimal detectable change: 21%).  2. Demo 130deg total cervical rotation to allow for pain free safe driving.  3. Patient will sleep at prior level of function per patient report.  4. Patient will be independent with progressed and modified home exercise program.  5. Demo strength/posture to allow for a full day of work when supporting her head.       Therapy procedure time and total treatment time can be found documented on the Time Entry flowsheet     with 3-0 vicryl rapide

## 2023-11-07 ENCOUNTER — APPOINTMENT (OUTPATIENT)
Dept: OBGYN | Facility: CLINIC | Age: 38
End: 2023-11-07
Payer: COMMERCIAL

## 2023-11-07 VITALS
HEIGHT: 67 IN | HEART RATE: 86 BPM | BODY MASS INDEX: 21.66 KG/M2 | OXYGEN SATURATION: 100 % | SYSTOLIC BLOOD PRESSURE: 106 MMHG | WEIGHT: 138 LBS | DIASTOLIC BLOOD PRESSURE: 69 MMHG

## 2023-11-07 DIAGNOSIS — Z01.419 ENCOUNTER FOR GYNECOLOGICAL EXAMINATION (GENERAL) (ROUTINE) W/OUT ABNORMAL FINDINGS: ICD-10-CM

## 2023-11-07 DIAGNOSIS — R92.30 INCONCLUSIVE MAMMOGRAM: ICD-10-CM

## 2023-11-07 DIAGNOSIS — Z11.51 ENCOUNTER FOR SCREENING FOR HUMAN PAPILLOMAVIRUS (HPV): ICD-10-CM

## 2023-11-07 DIAGNOSIS — Z80.3 FAMILY HISTORY OF MALIGNANT NEOPLASM OF BREAST: ICD-10-CM

## 2023-11-07 DIAGNOSIS — R92.2 INCONCLUSIVE MAMMOGRAM: ICD-10-CM

## 2023-11-07 DIAGNOSIS — Z12.31 ENCOUNTER FOR SCREENING MAMMOGRAM FOR MALIGNANT NEOPLASM OF BREAST: ICD-10-CM

## 2023-11-07 PROCEDURE — 99396 PREV VISIT EST AGE 40-64: CPT

## 2023-11-08 LAB — HPV HIGH+LOW RISK DNA PNL CVX: NOT DETECTED

## 2023-11-12 ENCOUNTER — TRANSCRIPTION ENCOUNTER (OUTPATIENT)
Age: 38
End: 2023-11-12

## 2023-11-12 LAB — CYTOLOGY CVX/VAG DOC THIN PREP: NORMAL

## 2023-11-14 ENCOUNTER — TRANSCRIPTION ENCOUNTER (OUTPATIENT)
Age: 38
End: 2023-11-14

## 2023-12-21 ENCOUNTER — TRANSCRIPTION ENCOUNTER (OUTPATIENT)
Age: 38
End: 2023-12-21

## 2023-12-22 ENCOUNTER — TRANSCRIPTION ENCOUNTER (OUTPATIENT)
Age: 38
End: 2023-12-22
